# Patient Record
Sex: FEMALE | Race: WHITE | NOT HISPANIC OR LATINO | Employment: OTHER | URBAN - METROPOLITAN AREA
[De-identification: names, ages, dates, MRNs, and addresses within clinical notes are randomized per-mention and may not be internally consistent; named-entity substitution may affect disease eponyms.]

---

## 2018-04-24 ENCOUNTER — ESTABLISHED COMPREHENSIVE EXAM (OUTPATIENT)
Dept: URBAN - METROPOLITAN AREA CLINIC 36 | Facility: CLINIC | Age: 68
End: 2018-04-24

## 2018-04-24 DIAGNOSIS — H52.7: ICD-10-CM

## 2018-04-24 PROCEDURE — 92014 COMPRE OPH EXAM EST PT 1/>: CPT

## 2018-04-24 PROCEDURE — 92015 DETERMINE REFRACTIVE STATE: CPT

## 2018-04-24 ASSESSMENT — VISUAL ACUITY
OS_SC: J3
OS_CC: 20/40-2
OS_PH: 20/25-2
OD_SC: J5
OD_CC: 20/50
OS_CC: J2
OS_SC: 20/25
OD_SC: 20/20-1
OD_CC: J2
OD_PH: 20/40

## 2018-04-24 ASSESSMENT — TONOMETRY
OS_IOP_MMHG: 18
OD_IOP_MMHG: 15

## 2019-02-15 NOTE — PATIENT DISCUSSION
Maxitrol dereck tid for 2 weeks and stop all facial makeup and cleansers. Slowly resume facial makeup and determine allergic source.

## 2019-04-25 ENCOUNTER — ESTABLISHED COMPREHENSIVE EXAM (OUTPATIENT)
Dept: URBAN - METROPOLITAN AREA CLINIC 36 | Facility: CLINIC | Age: 69
End: 2019-04-25

## 2019-04-25 DIAGNOSIS — H52.7: ICD-10-CM

## 2019-04-25 DIAGNOSIS — H25.812: ICD-10-CM

## 2019-04-25 DIAGNOSIS — H25.811: ICD-10-CM

## 2019-04-25 PROCEDURE — 92014 COMPRE OPH EXAM EST PT 1/>: CPT

## 2019-04-25 PROCEDURE — 92015 DETERMINE REFRACTIVE STATE: CPT

## 2019-04-25 ASSESSMENT — TONOMETRY
OD_IOP_MMHG: 16
OS_IOP_MMHG: 16

## 2019-04-25 ASSESSMENT — VISUAL ACUITY
OS_SC: 20/25+2
OD_SC: 20/20-1
OD_SC: J5
OS_SC: J3

## 2020-05-07 ENCOUNTER — ESTABLISHED COMPREHENSIVE EXAM (OUTPATIENT)
Dept: URBAN - METROPOLITAN AREA CLINIC 36 | Facility: CLINIC | Age: 70
End: 2020-05-07

## 2020-05-07 DIAGNOSIS — H52.7: ICD-10-CM

## 2020-05-07 DIAGNOSIS — H25.812: ICD-10-CM

## 2020-05-07 DIAGNOSIS — H25.811: ICD-10-CM

## 2020-05-07 PROCEDURE — 92015 DETERMINE REFRACTIVE STATE: CPT

## 2020-05-07 PROCEDURE — 92014 COMPRE OPH EXAM EST PT 1/>: CPT

## 2020-05-07 ASSESSMENT — VISUAL ACUITY
OS_CC: J2
OD_CC: J3
OS_SC: J3
OD_SC: J10
OS_SC: 20/20-1
OD_SC: 20/20

## 2020-05-07 ASSESSMENT — TONOMETRY
OD_IOP_MMHG: 16
OS_IOP_MMHG: 16

## 2021-06-10 ENCOUNTER — ESTABLISHED COMPREHENSIVE EXAM (OUTPATIENT)
Dept: URBAN - METROPOLITAN AREA CLINIC 36 | Facility: CLINIC | Age: 71
End: 2021-06-10

## 2021-06-10 DIAGNOSIS — H25.811: ICD-10-CM

## 2021-06-10 DIAGNOSIS — H25.812: ICD-10-CM

## 2021-06-10 DIAGNOSIS — H52.7: ICD-10-CM

## 2021-06-10 DIAGNOSIS — H04.123: ICD-10-CM

## 2021-06-10 DIAGNOSIS — H40.013: ICD-10-CM

## 2021-06-10 PROCEDURE — 92133 CPTRZD OPH DX IMG PST SGM ON: CPT

## 2021-06-10 PROCEDURE — 92014 COMPRE OPH EXAM EST PT 1/>: CPT

## 2021-06-10 PROCEDURE — 92015 DETERMINE REFRACTIVE STATE: CPT

## 2021-06-10 ASSESSMENT — VISUAL ACUITY
OS_SC: 20/25-1
OD_SC: J5
OD_SC: 20/30+2
OS_SC: J5
OS_CC: J2
OD_CC: J2

## 2021-06-10 ASSESSMENT — TONOMETRY
OD_IOP_MMHG: 16
OS_IOP_MMHG: 16

## 2022-06-13 ENCOUNTER — COMPREHENSIVE EXAM (OUTPATIENT)
Dept: URBAN - METROPOLITAN AREA CLINIC 36 | Facility: CLINIC | Age: 72
End: 2022-06-13

## 2022-06-13 DIAGNOSIS — H52.7: ICD-10-CM

## 2022-06-13 DIAGNOSIS — H25.813: ICD-10-CM

## 2022-06-13 DIAGNOSIS — H40.013: ICD-10-CM

## 2022-06-13 DIAGNOSIS — H04.123: ICD-10-CM

## 2022-06-13 PROCEDURE — 92133 CPTRZD OPH DX IMG PST SGM ON: CPT

## 2022-06-13 PROCEDURE — 92014 COMPRE OPH EXAM EST PT 1/>: CPT

## 2022-06-13 PROCEDURE — 92015GRNC REFRACTION GLASSES RECHECK NO CHARGE

## 2022-06-13 ASSESSMENT — VISUAL ACUITY
OD_SC: J8
OS_SC: 20/25
OD_PH: 20/20
OS_SC: J6
OD_CC: J2
OD_SC: 20/40
OS_CC: J2

## 2022-06-13 ASSESSMENT — TONOMETRY
OD_IOP_MMHG: 16
OS_IOP_MMHG: 17

## 2023-06-13 ENCOUNTER — COMPREHENSIVE EXAM (OUTPATIENT)
Dept: URBAN - METROPOLITAN AREA CLINIC 36 | Facility: CLINIC | Age: 73
End: 2023-06-13

## 2023-06-13 DIAGNOSIS — H02.831: ICD-10-CM

## 2023-06-13 DIAGNOSIS — H25.813: ICD-10-CM

## 2023-06-13 DIAGNOSIS — H04.123: ICD-10-CM

## 2023-06-13 DIAGNOSIS — H02.834: ICD-10-CM

## 2023-06-13 DIAGNOSIS — H40.013: ICD-10-CM

## 2023-06-13 PROCEDURE — 92014 COMPRE OPH EXAM EST PT 1/>: CPT

## 2023-06-13 PROCEDURE — 92133 CPTRZD OPH DX IMG PST SGM ON: CPT

## 2023-06-13 PROCEDURE — 92015 DETERMINE REFRACTIVE STATE: CPT

## 2023-06-13 ASSESSMENT — VISUAL ACUITY
OD_SC: 20/20-2
OS_CC: J3
OS_SC: 20/25-1
OS_SC: J6
OD_CC: J3
OD_SC: J6

## 2023-06-13 ASSESSMENT — TONOMETRY
OD_IOP_MMHG: 18
OS_IOP_MMHG: 18

## 2024-06-14 ENCOUNTER — COMPREHENSIVE EXAM (OUTPATIENT)
Dept: URBAN - METROPOLITAN AREA CLINIC 36 | Facility: CLINIC | Age: 74
End: 2024-06-14

## 2024-06-14 DIAGNOSIS — H04.123: ICD-10-CM

## 2024-06-14 DIAGNOSIS — H02.834: ICD-10-CM

## 2024-06-14 DIAGNOSIS — H52.7: ICD-10-CM

## 2024-06-14 DIAGNOSIS — H40.013: ICD-10-CM

## 2024-06-14 DIAGNOSIS — H02.831: ICD-10-CM

## 2024-06-14 DIAGNOSIS — H25.813: ICD-10-CM

## 2024-06-14 PROCEDURE — 92133 CPTRZD OPH DX IMG PST SGM ON: CPT

## 2024-06-14 PROCEDURE — 92014 COMPRE OPH EXAM EST PT 1/>: CPT

## 2024-06-14 PROCEDURE — 92015 DETERMINE REFRACTIVE STATE: CPT

## 2024-06-14 ASSESSMENT — VISUAL ACUITY
OS_SC: J5
OS_SC: 20/25
OU_SC: J6
OD_CC: J3
OS_CC: J2
OU_SC: 20/20
OU_CC: J3
OD_SC: 20/25
OD_SC: J6

## 2024-06-14 ASSESSMENT — TONOMETRY
OS_IOP_MMHG: 17
OD_IOP_MMHG: 18

## 2025-03-27 ENCOUNTER — EMERGENCY VISIT (OUTPATIENT)
Age: 75
End: 2025-03-27

## 2025-03-27 DIAGNOSIS — H04.122: ICD-10-CM

## 2025-03-27 PROCEDURE — 92012 INTRM OPH EXAM EST PATIENT: CPT

## 2025-07-08 ENCOUNTER — COMPREHENSIVE EXAM (OUTPATIENT)
Age: 75
End: 2025-07-08

## 2025-07-08 DIAGNOSIS — H25.813: ICD-10-CM

## 2025-07-08 DIAGNOSIS — H02.831: ICD-10-CM

## 2025-07-08 DIAGNOSIS — Z83.511: ICD-10-CM

## 2025-07-08 DIAGNOSIS — H04.123: ICD-10-CM

## 2025-07-08 DIAGNOSIS — H02.834: ICD-10-CM

## 2025-07-08 DIAGNOSIS — H40.013: ICD-10-CM

## 2025-07-08 PROCEDURE — 92133 CPTRZD OPH DX IMG PST SGM ON: CPT

## 2025-07-08 PROCEDURE — 92083 EXTENDED VISUAL FIELD XM: CPT

## 2025-07-08 PROCEDURE — 99214 OFFICE O/P EST MOD 30 MIN: CPT
